# Patient Record
Sex: MALE | Race: OTHER
[De-identification: names, ages, dates, MRNs, and addresses within clinical notes are randomized per-mention and may not be internally consistent; named-entity substitution may affect disease eponyms.]

---

## 2021-01-21 ENCOUNTER — TRANSCRIPTION ENCOUNTER (OUTPATIENT)
Age: 51
End: 2021-01-21

## 2021-01-22 ENCOUNTER — APPOINTMENT (OUTPATIENT)
Dept: DISASTER EMERGENCY | Facility: CLINIC | Age: 51
End: 2021-01-22

## 2021-01-22 ENCOUNTER — OUTPATIENT (OUTPATIENT)
Dept: INPATIENT UNIT | Facility: HOSPITAL | Age: 51
LOS: 1 days | Discharge: HOME | End: 2021-01-22

## 2021-01-22 VITALS
SYSTOLIC BLOOD PRESSURE: 136 MMHG | OXYGEN SATURATION: 96 % | RESPIRATION RATE: 18 BRPM | TEMPERATURE: 98 F | DIASTOLIC BLOOD PRESSURE: 89 MMHG | HEART RATE: 80 BPM

## 2021-01-22 VITALS
RESPIRATION RATE: 18 BRPM | OXYGEN SATURATION: 95 % | HEART RATE: 93 BPM | SYSTOLIC BLOOD PRESSURE: 138 MMHG | TEMPERATURE: 98 F | DIASTOLIC BLOOD PRESSURE: 93 MMHG

## 2021-01-22 DIAGNOSIS — U07.1 COVID-19: ICD-10-CM

## 2021-01-22 RX ORDER — BAMLANIVIMAB 35 MG/ML
700 INJECTION, SOLUTION INTRAVENOUS ONCE
Refills: 0 | Status: COMPLETED | OUTPATIENT
Start: 2021-01-22 | End: 2021-01-22

## 2021-01-22 RX ADMIN — BAMLANIVIMAB 270 MILLIGRAM(S): 35 INJECTION, SOLUTION INTRAVENOUS at 08:45

## 2021-01-22 NOTE — CHART NOTE - NSCHARTNOTEFT_GEN_A_CORE
50 year old male, past medical history CKD, obesity, who presents for monoclonal antibody infusion. Patient reports COVID-like symptoms x1 week, was tested x4 days ago and found to be positive. Patient endorses GI symptoms, fever, cough. Denies chest pain, SOB, abd pain, vomiting, urinary symptoms, leg pain/swelling.   : [place]onese, #[id number]      Review of Systems:  	•	CONSTITUTIONAL: no fever, no diaphoresis, no chills  	•	SKIN: no rash  	•	ENT: no sore throat, no difficulty swallowing   	•	RESPIRATORY: no shortness of breath, +cough  	•	CARDIAC: no chest pain, no palpitations  	•	GI: +diarrhea. no abd pain, no nausea, no vomiting  	•	GENITO-URINARY: no discharge, no dysuria; no hematuria, no increased urinary frequency  	•	MUSCULOSKELETAL: no joint paint, no swelling, no redness  	•	NEUROLOGIC: no headache, no syncope       CONSTITUTIONAL: Well-developed; well-nourished; in no acute distress, nontoxic appearing  SKIN: skin exam is warm and dry,  HEAD: Normocephalic; atraumatic.  ENT: MMM  NECK:  ROM intact.  CARD: S1, S2 normal, no murmur  RESP: No wheezes, rales or rhonchi. Good air movement bilaterally  ABD: soft; non-distended; non-tender.   EXT: Normal ROM. No calf tenderness/swelling   NEURO: awake, alert, following commands, oriented, grossly unremarkable. No Focal deficits. GCS 15. 50 year old male, past medical history CKD, obesity, who presents for monoclonal antibody infusion. Patient reports COVID-like symptoms x1 week, was tested x4 days ago and found to be positive. Patient endorses GI symptoms, fever, cough. Denies chest pain, SOB, abd pain, vomiting, urinary symptoms, leg pain/swelling.   : Kyrgyz, #[id number]      Review of Systems:  	•	CONSTITUTIONAL: no fever, no diaphoresis, no chills  	•	SKIN: no rash  	•	ENT: no sore throat, no difficulty swallowing   	•	RESPIRATORY: no shortness of breath, +cough  	•	CARDIAC: no chest pain, no palpitations  	•	GI: +diarrhea. no abd pain, no nausea, no vomiting  	•	GENITO-URINARY: no discharge, no dysuria; no hematuria, no increased urinary frequency  	•	MUSCULOSKELETAL: no joint paint, no swelling, no redness  	•	NEUROLOGIC: no headache, no syncope       CONSTITUTIONAL: Well-developed; well-nourished; in no acute distress, nontoxic appearing  SKIN: skin exam is warm and dry,  HEAD: Normocephalic; atraumatic.  ENT: MMM  NECK:  ROM intact.  CARD: S1, S2 normal, no murmur  RESP: No wheezes, rales or rhonchi. Good air movement bilaterally  ABD: soft; non-distended; non-tender.   EXT: Normal ROM. No calf tenderness/swelling   NEURO: awake, alert, following commands, oriented, grossly unremarkable. No Focal deficits. GCS 15.    I have reviewed the Bamlanivimab Emergency Use Authorization (EUA) and I have provided the patient or patient's caregiver with the following information:      1. FDA has authorized emergency use Bamlanivimab, which is not an FDA-approved biological product.  2. The patient or patient's caregiver has the option to accept or refuse administration of Bamlanivimab.   3. The significant known and potential risks and benefits of Bamlanivimab and the extent to which such risks and benefits are unknown.  4. Information on available alternative treatments and risks and benefits of those alternatives. 50 year old male, past medical history CKD, obesity, who presents for monoclonal antibody infusion. Patient reports COVID-like symptoms x1 week, was tested x4 days ago and found to be positive. Patient endorses GI symptoms, fever, cough. Denies chest pain, SOB, abd pain, vomiting, urinary symptoms, leg pain/swelling.   : [place]uvian, #[id number]      Review of Systems:  	•	CONSTITUTIONAL: no fever, no diaphoresis, no chills  	•	SKIN: no rash  	•	ENT: no sore throat, no difficulty swallowing   	•	RESPIRATORY: no shortness of breath, +cough  	•	CARDIAC: no chest pain, no palpitations  	•	GI: +diarrhea. no abd pain, no nausea, no vomiting  	•	GENITO-URINARY: no discharge, no dysuria; no hematuria, no increased urinary frequency  	•	MUSCULOSKELETAL: no joint paint, no swelling, no redness  	•	NEUROLOGIC: no headache, no syncope       CONSTITUTIONAL: Well-developed; well-nourished; in no acute distress, nontoxic appearing  SKIN: skin exam is warm and dry,  HEAD: Normocephalic; atraumatic.  ENT: MMM  NECK:  ROM intact.  CARD: S1, S2 normal, no murmur  RESP: No wheezes, rales or rhonchi. Good air movement bilaterally  ABD: soft; non-distended; non-tender.   EXT: Normal ROM. No calf tenderness/swelling   NEURO: awake, alert, following commands, oriented, grossly unremarkable. No Focal deficits. GCS 15.    I have reviewed the Bamlanivimab Emergency Use Authorization (EUA) and I have provided the patient or patient's caregiver with the following information:      1. FDA has authorized emergency use Bamlanivimab, which is not an FDA-approved biological product.  2. The patient or patient's caregiver has the option to accept or refuse administration of Bamlanivimab.   3. The significant known and potential risks and benefits of Bamlanivimab and the extent to which such risks and benefits are unknown.  4. Information on available alternative treatments and risks and benefits of those alternatives.    DISCHARGE: Patient discharged at 10:50am  Patient tolerated infusion well. Denies complaints of chest pain, SOB, dizziness, palpitations  VSS, stable for discharge home  D/C instructions given/fact sheet included. Patient educated on signs and symptoms to be aware of that should prompt presentation to ER. Patient educated on importance of obtaining pulse oximeter to assess oxygen @ home, patient verbalizes understanding. Patient d/c with spirometer.   patient to follow up with PMD as needed

## 2021-01-23 ENCOUNTER — TRANSCRIPTION ENCOUNTER (OUTPATIENT)
Age: 51
End: 2021-01-23